# Patient Record
(demographics unavailable — no encounter records)

---

## 2025-04-07 NOTE — PHYSICAL EXAM
[de-identified] : Neurologic: normal sensation, normal mood and affect, orientated and able to communicate   Constitutional: well developed and well nourished   Left Elbow pain with resisted supination  Distal biceps tenderness

## 2025-04-07 NOTE — HISTORY OF PRESENT ILLNESS
[de-identified] : The patient is a 41 year old [RIGHT] hand dominant female who presents today complaining of LT ELBOW pain.   Date of Injury/Onset: December 2024 Pain:    At Rest: 1/10  With Activity:  7-8/10  Mechanism of injury: onset of p[ain after blood work in December This is NOT a Work Related Injury being treated under Worker's Compensation. This is NOT an athletic injury occurring associated with an interscholastic or organized sports team. Quality of symptoms: ache over distal bicep tendon Improves with: rest, activity modification  Worse with: gripping, lifting weight Prior treatment: PRP RT ELBOW 4/22/21 and PRP LT ELBOW 10/11/21 by Dr Birmingham for BL lateral epicondylitis  Prior Imaging: none recent  Out of work/sport: currently working  School/Sport/Position/Occupation: [employer] Additional Information: None

## 2025-04-07 NOTE — DISCUSSION/SUMMARY
[de-identified] :  Reviewed all X-ray images with patient, interpretation was provided. MRI of the left elbow to eval for distal biceps tear.  pain since december sp injury with phlebotomist not improving with home exercises, anti-inflammatories Follow up after scan.    ----------------------------------------------- Home Exercise The patient is instructed on a home exercise program.  ILYA DARNELL Acting as a Scribe for Dr. Valencia MENDOZA, Ilya Darnell, attest that this documentation has been prepared under the direction and in the presence of Provider Jeff Birmingham MD.  Activity Modification The patient was advised to modify their activities.  Dx / Natural History The patient was advised of the diagnosis.  The natural history of the pathology was explained in full to the patient in layman's terms.  Several different treatment options were discussed and explained in full to the patient including the risks and benefits of both surgical and non-surgical treatments.  All questions and concerns were answered.  Pain Guide Activities The patient was advised to let pain guide the gradual advancement of activities.  RICE I explained to the patient that rest, ice, compression, and elevation would benefit them.  They may return to activity after follow-up or when they no longer have any pain.  The patient's current medication management of their orthopedic diagnosis was reviewed today: (1) We discussed a comprehensive treatment plan that included possible pharmaceutical management involving the use of prescription strength medications including but not limited to options such as oral Naprosyn 500mg BID, once daily Meloxicam 15 mg, or 500-650 mg Tylenol versus over the counter oral medications and topical prescription NSAID Pennsaid vs over the counter Voltaren gel. (2) There is a moderate risk of morbidity with further treatment, especially from use of prescription strength medications and possible side effects of these medications which include upset stomach with oral medications, skin reactions to topical medications and cardiac/renal issues with long term use. (3) I recommended that the patient follow-up with their medical physician to discuss any significant specific potential issues with long term medication use such as interactions with current medications or with exacerbation of underlying medical comorbidities. (4) The benefits and risks associated with use of injectable, oral or topical, prescription and over the counter anti-inflammatory medications were discussed with the patient. The patient voiced understanding of the risks including but not limited to bleeding, stroke, kidney dysfunction, heart disease, and were referred to the black box warning label for further information.

## 2025-04-16 NOTE — PHYSICAL EXAM
[de-identified] : Neurologic: normal sensation, normal mood and affect, orientated and able to communicate   Constitutional: well developed and well nourished   Left Elbow pain with resisted supination  Distal biceps tenderness

## 2025-04-16 NOTE — ADDENDUM
[FreeTextEntry1] : Documented by Manfred Mann acting as a scribe for Dr. Birmingham and Brijesh Singh PA-C on 04/16/2025 and was presence for the following sections: Physical Exam; Data Reviewed; Assessment; Discussion/Summary. All medical record entries made by the Scribe were at my, Dr. Birmingham, and Brijesh Singh, direction and personally dictated by me on 04/16/2025. I have reviewed the chart and agree that the record accurately reflects my personal performance of the history, physical exam, procedure and imaging.

## 2025-04-16 NOTE — HISTORY OF PRESENT ILLNESS
[de-identified] : The patient is a 41 year old [RIGHT] hand dominant female who presents today complaining of LT ELBOW pain.   Date of Injury/Onset: December 2024 Pain:    At Rest: 2/10  With Activity:  6/10  Mechanism of injury: onset of p[ain after blood work in December This is NOT a Work Related Injury being treated under Worker's Compensation. This is NOT an athletic injury occurring associated with an interscholastic or organized sports team. Quality of symptoms: ache over distal bicep tendon Improves with: rest, activity modification  Worse with: gripping, lifting weight Treatment/Imaging/Studies Since Last Visit: _ 	Reports Available For Review Today: MRI @ [facility] 4/8/25 Change since last visit: Increased pain due to food shopping. Out of work/sport: currently working  School/Sport/Position/Occupation: [employer] Additional Information: Patient reports she went food shopping which aggravated her symptoms and states feeling worse since last visit.

## 2025-04-16 NOTE — DISCUSSION/SUMMARY
[de-identified] : Reviewed all MRI images with patient today and interpretation was provided. Patient was given prescription of formal physical therapy for strengthening and stretching. DISTAL BICEPS TENDINOSIS Advised patient to wear Reparel elbow sleeve, informational card provided. Rx Naprosyn sent to patient pharmacy. Patient will follow up in 6 weeks.     *Recommended to Ceas[facility] at [facility]  in [place]*     ----------------------------------------------- Home Exercise The patient is instructed on a home exercise program.   DANIEL SHAH Acting as a Scribe for Dr. Valencia MENDOZA, Daniel Shah, attest that this documentation has been prepared under the direction and in the presence of Provider Dr. Birmingham.   Activity Modification The patient was advised to modify their activities.   Dx / Natural History The patient was advised of the diagnosis. The natural history of the pathology was explained in full to the patient in layman's terms. Several different treatment options were discussed and explained in full to the patient including the risks and benefits of both surgical and non-surgical treatments.  All questions and concerns were answered.   Pain Guide Activities The patient was advised to let pain guide the gradual advancement of activities.   RICE I explained to the patient that rest, ice, compression, and elevation would benefit them. They may return to activity after follow-up or when they no longer have any pain.   The patient's current medication management of their orthopedic diagnosis was reviewed today: (1) We discussed a comprehensive treatment plan that included possible pharmaceutical management involving the use of prescription strength medications including but not limited to options such as oral Naprosyn 500mg BID, once daily Meloxicam 15 mg, or 500-650 mg Tylenol versus over the counter oral medications and topical prescription NSAID Pennsaid vs over the counter Voltaren gel. (2) There is a moderate risk of morbidity with further treatment, especially from use of prescription strength medications and possible side effects of these medications which include upset stomach with oral medications, skin reactions to topical medications and cardiac/renal issues with long term use. (3) I recommended that the patient follow-up with their medical physician to discuss any significant specific potential issues with long term medication use such as interactions with current medications or with exacerbation of underlying medical comorbidities. (4) The benefits and risks associated with use of injectable, oral or topical, prescription and over the counter anti-inflammatory medications were discussed with the patient. The patient voiced understanding of the risks including but not limited to bleeding, stroke, kidney dysfunction, heart disease, and were referred to the black box warning label for further information.

## 2025-04-16 NOTE — DATA REVIEWED
[FreeTextEntry1] : 4/8/25 [facility] MRI Left Elbow: This scan was reviewed and interpreted by Dr. Birmingham, and his findings are- IMPRESSION: Mild distal biceps tendinosis, without tear.  04/07/25 [facility] X-ray left elbow 3 view: Unremarkable

## 2025-06-21 NOTE — HISTORY OF PRESENT ILLNESS
[de-identified] : The patient is a 41 year old [RIGHT] hand dominant female who presents today complaining of LT ELBOW pain.   Date of Injury/Onset: December 2024 Pain:    At Rest: 2/10  With Activity:  6/10  Mechanism of injury: onset of p[ain after blood work in December This is NOT a Work Related Injury being treated under Worker's Compensation. This is NOT an athletic injury occurring associated with an interscholastic or organized sports team. Quality of symptoms: ache over distal bicep tendon Improves with: rest, activity modification  Worse with: gripping, lifting weight Treatment/Imaging/Studies Since Last Visit: PT @ [facility] w/ Ceasar 	Reports Available For Review Today: MRI @ [facility] 4/8/25 Change since last visit: Has not been in formal PT since [facility] left [facility] - reports transitioning to [facility] for stretching. Reports inc soreness since stopping PT and think it also may be due to weather. Would liek to continue PT with Ceasar now that he has transitioned to [place] office School/Sport/Position/Occupation: [employer] Additional Information: none

## 2025-06-21 NOTE — PHYSICAL EXAM
[de-identified] : Neurologic: normal sensation, normal mood and affect, orientated and able to communicate Constitutional: well developed and well nourished  Left Elbow pain with resisted supination  Distal biceps tenderness

## 2025-06-21 NOTE — PHYSICAL EXAM
[de-identified] : Neurologic: normal sensation, normal mood and affect, orientated and able to communicate Constitutional: well developed and well nourished  Left Elbow pain with resisted supination  Distal biceps tenderness

## 2025-06-21 NOTE — DISCUSSION/SUMMARY
[de-identified] : Patient experiencing pain during today's visit. Discussed treatment options, including PRP injections. Patient would like to follow through with PRP injections at the end of the summer. Patient will continue physical therapy for strengthening and stretching. Patient will follow up at the end of August.     *Patient working with Ceasar at [facility]*     ----------------------------------------------- Home Exercise The patient is instructed on a home exercise program.   DANIEL SHAH Acting as a Scribe for Dr. Valencia MENDOZA, Daniel Shah, attest that this documentation has been prepared under the direction and in the presence of Provider Dr. Birmingham.   Activity Modification The patient was advised to modify their activities.   Dx / Natural History The patient was advised of the diagnosis. The natural history of the pathology was explained in full to the patient in layman's terms. Several different treatment options were discussed and explained in full to the patient including the risks and benefits of both surgical and non-surgical treatments.  All questions and concerns were answered.   Pain Guide Activities The patient was advised to let pain guide the gradual advancement of activities.   MIHAELA MENDOZA explained to the patient that rest, ice, compression, and elevation would benefit them. They may return to activity after follow-up or when they no longer have any pain.   The patient's current medication management of their orthopedic diagnosis was reviewed today: (1) We discussed a comprehensive treatment plan that included possible pharmaceutical management involving the use of prescription strength medications including but not limited to options such as oral Naprosyn 500mg BID, once daily Meloxicam 15 mg, or 500-650 mg Tylenol versus over the counter oral medications and topical prescription NSAID Pennsaid vs over the counter Voltaren gel. (2) There is a moderate risk of morbidity with further treatment, especially from use of prescription strength medications and possible side effects of these medications which include upset stomach with oral medications, skin reactions to topical medications and cardiac/renal issues with long term use. (3) I recommended that the patient follow-up with their medical physician to discuss any significant specific potential issues with long term medication use such as interactions with current medications or with exacerbation of underlying medical comorbidities. (4) The benefits and risks associated with use of injectable, oral or topical, prescription and over the counter anti-inflammatory medications were discussed with the patient. The patient voiced understanding of the risks including but not limited to bleeding, stroke, kidney dysfunction, heart disease, and were referred to the black box warning label for further information.

## 2025-06-21 NOTE — DISCUSSION/SUMMARY
[de-identified] : Patient experiencing pain during today's visit. Discussed treatment options, including PRP injections. Patient would like to follow through with PRP injections at the end of the summer. Patient will continue physical therapy for strengthening and stretching. Patient will follow up at the end of August.     *Patient working with Ceasar at [facility]*     ----------------------------------------------- Home Exercise The patient is instructed on a home exercise program.   DANIEL SHAH Acting as a Scribe for Dr. Valencia MENDOZA, Daniel Shah, attest that this documentation has been prepared under the direction and in the presence of Provider Dr. Birmingham.   Activity Modification The patient was advised to modify their activities.   Dx / Natural History The patient was advised of the diagnosis. The natural history of the pathology was explained in full to the patient in layman's terms. Several different treatment options were discussed and explained in full to the patient including the risks and benefits of both surgical and non-surgical treatments.  All questions and concerns were answered.   Pain Guide Activities The patient was advised to let pain guide the gradual advancement of activities.   MIHAELA MENDOZA explained to the patient that rest, ice, compression, and elevation would benefit them. They may return to activity after follow-up or when they no longer have any pain.   The patient's current medication management of their orthopedic diagnosis was reviewed today: (1) We discussed a comprehensive treatment plan that included possible pharmaceutical management involving the use of prescription strength medications including but not limited to options such as oral Naprosyn 500mg BID, once daily Meloxicam 15 mg, or 500-650 mg Tylenol versus over the counter oral medications and topical prescription NSAID Pennsaid vs over the counter Voltaren gel. (2) There is a moderate risk of morbidity with further treatment, especially from use of prescription strength medications and possible side effects of these medications which include upset stomach with oral medications, skin reactions to topical medications and cardiac/renal issues with long term use. (3) I recommended that the patient follow-up with their medical physician to discuss any significant specific potential issues with long term medication use such as interactions with current medications or with exacerbation of underlying medical comorbidities. (4) The benefits and risks associated with use of injectable, oral or topical, prescription and over the counter anti-inflammatory medications were discussed with the patient. The patient voiced understanding of the risks including but not limited to bleeding, stroke, kidney dysfunction, heart disease, and were referred to the black box warning label for further information.

## 2025-06-21 NOTE — ADDENDUM
[FreeTextEntry1] : Documented by Manfred Mann acting as a scribe for Dr. Birmingham and Brijesh Singh PA-C on 06/16/2025 and was presence for the following sections: Physical Exam; Data Reviewed; Assessment; Discussion/Summary. All medical record entries made by the Scribe were at my, Dr. Birmingham, and Brijesh Singh, direction and personally dictated by me on 06/16/2025. I have reviewed the chart and agree that the record accurately reflects my personal performance of the history, physical exam, procedure and imaging.

## 2025-06-21 NOTE — HISTORY OF PRESENT ILLNESS
[de-identified] : The patient is a 41 year old [RIGHT] hand dominant female who presents today complaining of LT ELBOW pain.   Date of Injury/Onset: December 2024 Pain:    At Rest: 2/10  With Activity:  6/10  Mechanism of injury: onset of p[ain after blood work in December This is NOT a Work Related Injury being treated under Worker's Compensation. This is NOT an athletic injury occurring associated with an interscholastic or organized sports team. Quality of symptoms: ache over distal bicep tendon Improves with: rest, activity modification  Worse with: gripping, lifting weight Treatment/Imaging/Studies Since Last Visit: PT @ [facility] w/ Ceasar 	Reports Available For Review Today: MRI @ [facility] 4/8/25 Change since last visit: Has not been in formal PT since [facility] left [facility] - reports transitioning to [facility] for stretching. Reports inc soreness since stopping PT and think it also may be due to weather. Would liek to continue PT with Ceasar now that he has transitioned to [place] office School/Sport/Position/Occupation: [employer] Additional Information: none

## 2025-07-03 NOTE — DISCUSSION/SUMMARY
[FreeTextEntry1] :  Well woman exam pap done  return in 1 year right breast pain/lump-sono added and mammography
